# Patient Record
Sex: FEMALE | Race: WHITE | NOT HISPANIC OR LATINO | Employment: FULL TIME | URBAN - METROPOLITAN AREA
[De-identification: names, ages, dates, MRNs, and addresses within clinical notes are randomized per-mention and may not be internally consistent; named-entity substitution may affect disease eponyms.]

---

## 2023-04-21 ENCOUNTER — APPOINTMENT (OUTPATIENT)
Dept: PRIMARY CARE | Facility: CLINIC | Age: 63
End: 2023-04-21
Payer: COMMERCIAL

## 2023-05-04 ENCOUNTER — APPOINTMENT (OUTPATIENT)
Dept: PRIMARY CARE | Facility: CLINIC | Age: 63
End: 2023-05-04
Payer: COMMERCIAL

## 2023-05-11 ENCOUNTER — OFFICE VISIT (OUTPATIENT)
Dept: PRIMARY CARE | Facility: CLINIC | Age: 63
End: 2023-05-11
Payer: COMMERCIAL

## 2023-05-11 VITALS
HEART RATE: 90 BPM | WEIGHT: 150 LBS | BODY MASS INDEX: 29.45 KG/M2 | DIASTOLIC BLOOD PRESSURE: 85 MMHG | HEIGHT: 60 IN | SYSTOLIC BLOOD PRESSURE: 134 MMHG | OXYGEN SATURATION: 97 %

## 2023-05-11 DIAGNOSIS — Z12.31 ENCOUNTER FOR SCREENING MAMMOGRAM FOR MALIGNANT NEOPLASM OF BREAST: ICD-10-CM

## 2023-05-11 DIAGNOSIS — E78.5 HYPERLIPIDEMIA, UNSPECIFIED HYPERLIPIDEMIA TYPE: Primary | ICD-10-CM

## 2023-05-11 DIAGNOSIS — I10 BENIGN ESSENTIAL HYPERTENSION: ICD-10-CM

## 2023-05-11 PROBLEM — E55.9 VITAMIN D DEFICIENCY: Status: ACTIVE | Noted: 2023-05-11

## 2023-05-11 PROCEDURE — 3075F SYST BP GE 130 - 139MM HG: CPT | Performed by: NURSE PRACTITIONER

## 2023-05-11 PROCEDURE — 1036F TOBACCO NON-USER: CPT | Performed by: NURSE PRACTITIONER

## 2023-05-11 PROCEDURE — 99214 OFFICE O/P EST MOD 30 MIN: CPT | Performed by: NURSE PRACTITIONER

## 2023-05-11 PROCEDURE — 3079F DIAST BP 80-89 MM HG: CPT | Performed by: NURSE PRACTITIONER

## 2023-05-11 RX ORDER — TRIAMCINOLONE ACETONIDE 5 MG/G
CREAM TOPICAL
COMMUNITY
Start: 2023-03-21

## 2023-05-11 RX ORDER — PREDNISONE 10 MG/1
10 TABLET ORAL DAILY
COMMUNITY
Start: 2023-03-21 | End: 2023-05-11 | Stop reason: ALTCHOICE

## 2023-05-11 RX ORDER — LABETALOL 100 MG/1
1 TABLET, FILM COATED ORAL EVERY 12 HOURS
COMMUNITY
End: 2023-05-11 | Stop reason: SDUPTHER

## 2023-05-11 RX ORDER — ATORVASTATIN CALCIUM 10 MG/1
10 TABLET, FILM COATED ORAL DAILY
COMMUNITY
End: 2023-05-11 | Stop reason: SDUPTHER

## 2023-05-11 RX ORDER — ATORVASTATIN CALCIUM 10 MG/1
10 TABLET, FILM COATED ORAL DAILY
Qty: 90 TABLET | Refills: 1 | Status: SHIPPED | OUTPATIENT
Start: 2023-05-11 | End: 2023-11-15

## 2023-05-11 RX ORDER — VIT C/E/ZN/COPPR/LUTEIN/ZEAXAN 250MG-90MG
25 CAPSULE ORAL DAILY
COMMUNITY
Start: 2021-03-17

## 2023-05-11 RX ORDER — MELOXICAM 15 MG/1
15 TABLET ORAL DAILY
COMMUNITY
Start: 2021-01-04 | End: 2023-05-11 | Stop reason: ALTCHOICE

## 2023-05-11 RX ORDER — APREMILAST 30 MG/1
30 TABLET, FILM COATED ORAL DAILY
COMMUNITY
Start: 2020-09-14

## 2023-05-11 RX ORDER — LABETALOL 100 MG/1
1 TABLET, FILM COATED ORAL EVERY 12 HOURS
Qty: 180 TABLET | Refills: 1 | Status: SHIPPED | OUTPATIENT
Start: 2023-05-11 | End: 2023-11-15

## 2023-05-11 ASSESSMENT — ENCOUNTER SYMPTOMS
MUSCULOSKELETAL NEGATIVE: 1
SORE THROAT: 0
DIARRHEA: 0
SHORTNESS OF BREATH: 0
CHILLS: 0
FEVER: 0
VOMITING: 0
PSYCHIATRIC NEGATIVE: 1
DIZZINESS: 0
NAUSEA: 0
PALPITATIONS: 0
HEADACHES: 0
NUMBNESS: 0
CONSTIPATION: 0
ABDOMINAL PAIN: 0
WEAKNESS: 0
FATIGUE: 0
COUGH: 0

## 2023-05-11 ASSESSMENT — PATIENT HEALTH QUESTIONNAIRE - PHQ9
1. LITTLE INTEREST OR PLEASURE IN DOING THINGS: NOT AT ALL
2. FEELING DOWN, DEPRESSED OR HOPELESS: NOT AT ALL
SUM OF ALL RESPONSES TO PHQ9 QUESTIONS 1 AND 2: 0

## 2023-05-11 NOTE — PROGRESS NOTES
Subjective   Patient ID: Laura Bansal is a 63 y.o. female who presents for follow up and medication refills.    HPI   Patient overall feeling well  Denies chest pain, SOB, palpitations, dizziness,  or GI issues.  Needs refills and screening mammogram  Non-smoker  Physically active but no formal exercise routine  Have labs drawn through employer, will get fasting lipid panel   taking medications as prescribed  Blood pressure controlled    Review of Systems   Constitutional:  Negative for chills, fatigue and fever.   HENT:  Negative for congestion, ear pain and sore throat.    Eyes:  Negative for visual disturbance.   Respiratory:  Negative for cough and shortness of breath.    Cardiovascular:  Negative for chest pain, palpitations and leg swelling.   Gastrointestinal:  Negative for abdominal pain, constipation, diarrhea, nausea and vomiting.   Genitourinary: Negative.    Musculoskeletal: Negative.    Skin:  Negative for rash.   Neurological:  Negative for dizziness, weakness, numbness and headaches.   Psychiatric/Behavioral: Negative.         Objective   /85   Pulse 90   Ht 1.524 m (5')   Wt 68 kg (150 lb)   SpO2 97%   BMI 29.29 kg/m²     Physical Exam  Constitutional:       General: She is not in acute distress.     Appearance: Normal appearance.   HENT:      Head: Normocephalic and atraumatic.      Right Ear: Tympanic membrane, ear canal and external ear normal.      Left Ear: Tympanic membrane, ear canal and external ear normal.      Nose: Nose normal.      Mouth/Throat:      Mouth: Mucous membranes are moist.      Pharynx: Oropharynx is clear.   Eyes:      Extraocular Movements: Extraocular movements intact.      Conjunctiva/sclera: Conjunctivae normal.      Pupils: Pupils are equal, round, and reactive to light.   Cardiovascular:      Rate and Rhythm: Normal rate and regular rhythm.      Pulses: Normal pulses.      Heart sounds: Normal heart sounds. No murmur heard.  Pulmonary:      Effort:  Pulmonary effort is normal.      Breath sounds: Normal breath sounds. No wheezing, rhonchi or rales.   Abdominal:      General: Bowel sounds are normal.      Palpations: Abdomen is soft.      Tenderness: There is no abdominal tenderness.   Musculoskeletal:         General: Normal range of motion.      Cervical back: Normal range of motion and neck supple.   Lymphadenopathy:      Comments: No lymphadenopathy noted   Skin:     General: Skin is warm and dry.      Findings: No rash.   Neurological:      General: No focal deficit present.      Mental Status: She is alert and oriented to person, place, and time.      Cranial Nerves: No cranial nerve deficit.      Coordination: Coordination normal.      Gait: Gait normal.   Psychiatric:         Mood and Affect: Mood normal.         Behavior: Behavior normal.         Assessment/Plan   Problem List Items Addressed This Visit          Circulatory    Benign essential hypertension     Continue labetalol as prescribed  Reduce dietary sodium         Relevant Medications    labetalol (Normodyne) 100 mg tablet    Other Relevant Orders    CT cardiac scoring wo IV contrast       Other    Hyperlipidemia - Primary     Continue atorvastatin as prescribed         Relevant Medications    atorvastatin (Lipitor) 10 mg tablet    Other Relevant Orders    Lipid Panel     Other Visit Diagnoses       Encounter for screening mammogram for malignant neoplasm of breast        Relevant Orders    BI mammo bilateral screening tomosynthesis

## 2023-05-16 ENCOUNTER — LAB (OUTPATIENT)
Dept: LAB | Facility: LAB | Age: 63
End: 2023-05-16
Payer: COMMERCIAL

## 2023-05-16 DIAGNOSIS — E78.5 HYPERLIPIDEMIA, UNSPECIFIED HYPERLIPIDEMIA TYPE: ICD-10-CM

## 2023-05-16 LAB
CHOLESTEROL (MG/DL) IN SER/PLAS: 178 MG/DL (ref 0–199)
CHOLESTEROL IN HDL (MG/DL) IN SER/PLAS: 38.5 MG/DL
CHOLESTEROL/HDL RATIO: 4.6
LDL: 114 MG/DL (ref 0–99)
TRIGLYCERIDE (MG/DL) IN SER/PLAS: 127 MG/DL (ref 0–149)
VLDL: 25 MG/DL (ref 0–40)

## 2023-05-16 PROCEDURE — 80061 LIPID PANEL: CPT

## 2023-05-16 PROCEDURE — 36415 COLL VENOUS BLD VENIPUNCTURE: CPT

## 2023-08-02 ENCOUNTER — TELEPHONE (OUTPATIENT)
Dept: PRIMARY CARE | Facility: CLINIC | Age: 63
End: 2023-08-02
Payer: COMMERCIAL

## 2023-08-02 NOTE — TELEPHONE ENCOUNTER
Called with CT cardiac calcium results.  Went from 0 in 20 18-47 currently.  Offered the option of cardiology consult.  We can discuss at next visit.

## 2024-06-01 DIAGNOSIS — I10 BENIGN ESSENTIAL HYPERTENSION: ICD-10-CM

## 2024-06-01 DIAGNOSIS — E78.5 HYPERLIPIDEMIA, UNSPECIFIED HYPERLIPIDEMIA TYPE: ICD-10-CM

## 2024-06-03 RX ORDER — ATORVASTATIN CALCIUM 10 MG/1
10 TABLET, FILM COATED ORAL DAILY
Qty: 90 TABLET | Refills: 0 | Status: SHIPPED | OUTPATIENT
Start: 2024-06-03

## 2024-06-03 RX ORDER — LABETALOL 100 MG/1
1 TABLET, FILM COATED ORAL EVERY 12 HOURS
Qty: 180 TABLET | Refills: 0 | Status: SHIPPED | OUTPATIENT
Start: 2024-06-03

## 2024-09-04 ENCOUNTER — HOSPITAL ENCOUNTER (OUTPATIENT)
Dept: RADIOLOGY | Facility: HOSPITAL | Age: 64
Discharge: HOME | End: 2024-09-04
Payer: COMMERCIAL

## 2024-09-04 DIAGNOSIS — Z78.0 ASYMPTOMATIC MENOPAUSAL STATE: ICD-10-CM

## 2024-09-04 PROCEDURE — 77080 DXA BONE DENSITY AXIAL: CPT | Performed by: RADIOLOGY

## 2024-09-04 PROCEDURE — 77080 DXA BONE DENSITY AXIAL: CPT

## 2024-09-04 ASSESSMENT — LIFESTYLE VARIABLES
CURRENT_SMOKER: N
3_OR_MORE_DRINKS_PER_DAY: N

## 2024-10-17 DIAGNOSIS — I10 BENIGN ESSENTIAL HYPERTENSION: ICD-10-CM

## 2024-10-23 RX ORDER — LABETALOL 100 MG/1
TABLET, FILM COATED ORAL EVERY 12 HOURS
Qty: 180 TABLET | Refills: 0 | Status: SHIPPED | OUTPATIENT
Start: 2024-10-23

## 2025-01-17 ENCOUNTER — APPOINTMENT (OUTPATIENT)
Dept: PRIMARY CARE | Facility: CLINIC | Age: 65
End: 2025-01-17
Payer: COMMERCIAL

## 2025-01-17 VITALS
DIASTOLIC BLOOD PRESSURE: 80 MMHG | HEART RATE: 78 BPM | BODY MASS INDEX: 30.47 KG/M2 | SYSTOLIC BLOOD PRESSURE: 140 MMHG | WEIGHT: 156 LBS | OXYGEN SATURATION: 98 %

## 2025-01-17 DIAGNOSIS — E78.2 MIXED HYPERLIPIDEMIA: ICD-10-CM

## 2025-01-17 DIAGNOSIS — Z00.00 HEALTH CARE MAINTENANCE: Primary | ICD-10-CM

## 2025-01-17 DIAGNOSIS — E53.8 B12 DEFICIENCY: ICD-10-CM

## 2025-01-17 DIAGNOSIS — I10 BENIGN ESSENTIAL HYPERTENSION: ICD-10-CM

## 2025-01-17 PROBLEM — L40.50 PSORIATIC ARTHRITIS (MULTI): Status: ACTIVE | Noted: 2025-01-17

## 2025-01-17 PROBLEM — L40.9 PSORIASIS: Status: ACTIVE | Noted: 2025-01-17

## 2025-01-17 PROCEDURE — 3079F DIAST BP 80-89 MM HG: CPT

## 2025-01-17 PROCEDURE — 99213 OFFICE O/P EST LOW 20 MIN: CPT

## 2025-01-17 PROCEDURE — 3077F SYST BP >= 140 MM HG: CPT

## 2025-01-17 PROCEDURE — 99396 PREV VISIT EST AGE 40-64: CPT

## 2025-01-17 ASSESSMENT — PATIENT HEALTH QUESTIONNAIRE - PHQ9
SUM OF ALL RESPONSES TO PHQ9 QUESTIONS 1 AND 2: 0
1. LITTLE INTEREST OR PLEASURE IN DOING THINGS: NOT AT ALL
2. FEELING DOWN, DEPRESSED OR HOPELESS: NOT AT ALL

## 2025-01-17 NOTE — PROGRESS NOTES
Subjective   Patient ID: Laura Bansal is a 64 y.o. female who presents for Establish Care.              HPI  1.  Physical exam.  Pap: last done 2017, declines repeat   Mammogram: last done 2023  Colonoscopy: last done , ten years  Immunizations: none documented    2. HLD   CT cardiac score 47.68 as compared to 0 in 2018.   Atorvastatin 10mg     3. HTN  Labetalol 100mg BID   BP today 140/80    4. Psoriasis, psoriatic arthritis  Otezla 30mg     Social:  - never smoker  - no alcohol  - no illicits     Surgical:  - breat biopsies (benign)    Family hx:   - father: heart disease, CABG  - three aunts with breast cancer     Review of Systems  All pertinent positive symptoms are included in the history of present illness.    All other systems have been reviewed and are negative and noncontributory to this patient's current ailments.    Past Medical History:   Diagnosis Date    Asymptomatic menopausal state     Menopause    Encounter for full-term uncomplicated delivery      (spontaneous vaginal delivery)    Menopausal and female climacteric states     Hot flash, menopausal    Personal history of other complications of pregnancy, childbirth and the puerperium     History of     Personal history of other complications of pregnancy, childbirth and the puerperium     History of miscarriage    Personal history of other diseases of the circulatory system     History of hypertension    Personal history of other diseases of the musculoskeletal system and connective tissue     History of arthritis     Past Surgical History:   Procedure Laterality Date    BREAST SURGERY  2015    Breast Surgery Reduction Procedure    DILATION AND CURETTAGE OF UTERUS  2015    Dilation And Curettage    HAND SURGERY  2017    Hand Surgery                                                                                                                                                          OTHER SURGICAL  HISTORY  05/06/2015    Anal Fistulectomy    OTHER SURGICAL HISTORY  05/06/2015    Breast Surgery Puncture Aspiration Of Cyst     Social History     Tobacco Use    Smoking status: Never    Smokeless tobacco: Never   Vaping Use    Vaping status: Never Used   Substance Use Topics    Alcohol use: Never    Drug use: Never     No family history on file.    There is no immunization history on file for this patient.  Current Outpatient Medications   Medication Instructions    atorvastatin (LIPITOR) 10 mg, oral, Daily    cholecalciferol (VITAMIN D-3) 25 mcg, Daily    labetalol (Normodyne) 100 mg tablet oral, Every 12 hours    Otezla 30 mg, Daily    triamcinolone (Kenalog) 0.5 % cream ON THE SKIN APPLY TO PSORIASIS TWICE DAILY. 10 DAYS ON, 10 DAYS OFF. DO NOT USE ON THE FACE.     Allergies   Allergen Reactions    Morphine GI Upset    Adhesive Tape-Silicones Rash    Penicillins Hives and Rash       Objective   Vitals:    01/17/25 1336   BP: 140/80   Pulse: 78   SpO2: 98%   Weight: 70.8 kg (156 lb)     Body mass index is 30.47 kg/m².    BP Readings from Last 3 Encounters:   01/17/25 140/80   05/11/23 134/85   11/17/21 146/84      Wt Readings from Last 3 Encounters:   01/17/25 70.8 kg (156 lb)   05/11/23 68 kg (150 lb)   11/17/21 64.9 kg (143 lb)        No visits with results within 1 Month(s) from this visit.   Latest known visit with results is:   Lab on 05/16/2023   Component Date Value    Cholesterol 05/16/2023 178     HDL 05/16/2023 38.5 (A)     Cholesterol/HDL Ratio 05/16/2023 4.6     LDL 05/16/2023 114 (H)     VLDL 05/16/2023 25     Triglycerides 05/16/2023 127      Physical Exam  CONSTITUTIONAL - well nourished, well developed, looks like stated age, in no acute distress, not ill-appearing, and not tired appearing  SKIN - normal skin color and pigmentation, normal skin turgor without rash, lesions, or nodules visualized  HEAD - no trauma, normocephalic  EYES - pupils are equal and reactive to light, extraocular muscles  are intact, and normal external exam  NECK - supple without rigidity, no neck mass was observed, no thyromegaly or thyroid nodules  CHEST - clear to auscultation, no wheezing, no crackles and no rales, good effort  CARDIAC - regular rate and regular rhythm, no skipped beats, no murmur  NEUROLOGICAL - normal gait, normal balance, normal motor, no ataxia; alert, oriented and no focal signs  PSYCHIATRIC - alert, pleasant and cordial, age-appropriate  IMMUNOLOGIC - no cervical lymphadenopathy    Assessment/Plan   Problem List Items Addressed This Visit    None  Visit Diagnoses       Health care maintenance    -  Primary    Relevant Orders    BI mammo bilateral screening tomosynthesis    TSH with reflex to Free T4 if abnormal    Lipid Panel    Comprehensive Metabolic Panel    CBC and Auto Differential    Hemoglobin A1C    B12 deficiency        Relevant Orders    CBC and Auto Differential          Laura presents today to establish care and for annual exam.    Healthcare Maintenance  - declines repeat pap  - declines immunizations     HTN  - discussed switching labetalol to ACEI/arb or CCB but agreed to obtain updated labs first   - agreed to keep a BP log at home    HLD  - CT cardiac score 48  - discussed obtaining updated labs prior to potentially increasing statin therapy    Results will be communicated accordingly.    FU in one year for annual exam or earlier depending on lab results.    Piotr Haq, DO  PGY-2 Family Medicine

## 2025-01-21 DIAGNOSIS — I10 BENIGN ESSENTIAL HYPERTENSION: ICD-10-CM

## 2025-01-24 RX ORDER — LABETALOL 100 MG/1
TABLET, FILM COATED ORAL EVERY 12 HOURS
Qty: 180 TABLET | Refills: 0 | Status: SHIPPED | OUTPATIENT
Start: 2025-01-24

## 2025-02-01 DIAGNOSIS — E78.5 HYPERLIPIDEMIA, UNSPECIFIED HYPERLIPIDEMIA TYPE: ICD-10-CM

## 2025-02-03 RX ORDER — ATORVASTATIN CALCIUM 10 MG/1
10 TABLET, FILM COATED ORAL DAILY
Qty: 30 TABLET | Refills: 0 | OUTPATIENT
Start: 2025-02-03

## 2025-02-03 NOTE — TELEPHONE ENCOUNTER
Patient needs to obtain pending labs for refills. [facility] labs were over two years ago

## 2025-02-06 ENCOUNTER — HOSPITAL ENCOUNTER (OUTPATIENT)
Dept: RADIOLOGY | Facility: HOSPITAL | Age: 65
Discharge: HOME | End: 2025-02-06
Payer: COMMERCIAL

## 2025-02-06 VITALS — HEIGHT: 60 IN | BODY MASS INDEX: 30.63 KG/M2 | WEIGHT: 156 LBS

## 2025-02-06 DIAGNOSIS — Z12.31 ENCOUNTER FOR SCREENING MAMMOGRAM FOR MALIGNANT NEOPLASM OF BREAST: ICD-10-CM

## 2025-02-06 PROCEDURE — 77063 BREAST TOMOSYNTHESIS BI: CPT | Performed by: RADIOLOGY

## 2025-02-06 PROCEDURE — 77067 SCR MAMMO BI INCL CAD: CPT

## 2025-02-06 PROCEDURE — 77067 SCR MAMMO BI INCL CAD: CPT | Performed by: RADIOLOGY

## 2025-02-07 LAB
CHOLEST SERPL-MCNC: 199 MG/DL
CHOLEST/HDLC SERPL: 5.1 (CALC)
EST. AVERAGE GLUCOSE BLD GHB EST-MCNC: 108 MG/DL
EST. AVERAGE GLUCOSE BLD GHB EST-SCNC: 6 MMOL/L
HBA1C MFR BLD: 5.4 % OF TOTAL HGB
HDLC SERPL-MCNC: 39 MG/DL
LDLC SERPL CALC-MCNC: 135 MG/DL (CALC)
NONHDLC SERPL-MCNC: 160 MG/DL (CALC)
TRIGL SERPL-MCNC: 127 MG/DL

## 2025-03-06 ENCOUNTER — OFFICE VISIT (OUTPATIENT)
Dept: PRIMARY CARE | Facility: CLINIC | Age: 65
End: 2025-03-06
Payer: COMMERCIAL

## 2025-03-06 VITALS
OXYGEN SATURATION: 97 % | SYSTOLIC BLOOD PRESSURE: 150 MMHG | BODY MASS INDEX: 26.75 KG/M2 | HEIGHT: 63 IN | DIASTOLIC BLOOD PRESSURE: 84 MMHG | HEART RATE: 78 BPM | WEIGHT: 151 LBS

## 2025-03-06 DIAGNOSIS — R09.82 POST-NASAL DRIP: Primary | ICD-10-CM

## 2025-03-06 DIAGNOSIS — I10 BENIGN ESSENTIAL HYPERTENSION: ICD-10-CM

## 2025-03-06 PROCEDURE — 3079F DIAST BP 80-89 MM HG: CPT | Performed by: STUDENT IN AN ORGANIZED HEALTH CARE EDUCATION/TRAINING PROGRAM

## 2025-03-06 PROCEDURE — 3077F SYST BP >= 140 MM HG: CPT | Performed by: STUDENT IN AN ORGANIZED HEALTH CARE EDUCATION/TRAINING PROGRAM

## 2025-03-06 PROCEDURE — G2211 COMPLEX E/M VISIT ADD ON: HCPCS | Performed by: STUDENT IN AN ORGANIZED HEALTH CARE EDUCATION/TRAINING PROGRAM

## 2025-03-06 PROCEDURE — 99214 OFFICE O/P EST MOD 30 MIN: CPT | Performed by: STUDENT IN AN ORGANIZED HEALTH CARE EDUCATION/TRAINING PROGRAM

## 2025-03-06 PROCEDURE — 3008F BODY MASS INDEX DOCD: CPT | Performed by: STUDENT IN AN ORGANIZED HEALTH CARE EDUCATION/TRAINING PROGRAM

## 2025-03-06 RX ORDER — OLMESARTAN MEDOXOMIL 20 MG/1
20 TABLET ORAL DAILY
Qty: 30 TABLET | Refills: 1 | Status: SHIPPED | OUTPATIENT
Start: 2025-03-06 | End: 2025-05-05

## 2025-03-06 ASSESSMENT — PATIENT HEALTH QUESTIONNAIRE - PHQ9
2. FEELING DOWN, DEPRESSED OR HOPELESS: NOT AT ALL
SUM OF ALL RESPONSES TO PHQ9 QUESTIONS 1 AND 2: 0
1. LITTLE INTEREST OR PLEASURE IN DOING THINGS: NOT AT ALL

## 2025-03-06 NOTE — PROGRESS NOTES
Clinic Note: Family Medicine    Patient: Laura Bansal  Encounter Date: 3/6/25  Subjective:  Chief Complaint   Patient presents with    Hypertension     Sinus infection now      History of Present Illness:  Laura Bansal is a 64 y.o. female with past history of HTN, HLD who presents for HTN follow up though reports of nasal congestion and post nasal drip.    Working as a / for water [employer]   +sick contact   3 weeks symptoms head pressure, nasal congestion, post nasal drip. Denies fever, sore throat, otalgia.   She has been using dayquil, nightquil, cold and flu  She notes that she is getting better, most concern is drainage    Hypertension  Home BP average 150s/mid 80s  Taking olmesartan 20mg daily without adverse effects   Denies lightheadedness, dizziness, headache    Review of Systems:  See HPI    Past Medical History:   Past Medical History:   Diagnosis Date    Asymptomatic menopausal state     Menopause    Encounter for full-term uncomplicated delivery      (spontaneous vaginal delivery)    Menopausal and female climacteric states     Hot flash, menopausal    Personal history of other complications of pregnancy, childbirth and the puerperium     History of     Personal history of other complications of pregnancy, childbirth and the puerperium     History of miscarriage    Personal history of other diseases of the circulatory system     History of hypertension    Personal history of other diseases of the musculoskeletal system and connective tissue     History of arthritis       Past Surgical History:   Procedure Laterality Date    BREAST BIOPSY  1998    BREAST SURGERY  2015    Breast Surgery Reduction Procedure    DILATION AND CURETTAGE OF UTERUS  2015    Dilation And Curettage    HAND SURGERY  2017    Hand Surgery                                                                                                                                                           OTHER SURGICAL HISTORY  05/06/2015    Anal Fistulectomy    OTHER SURGICAL HISTORY  05/06/2015    Breast Surgery Puncture Aspiration Of Cyst    REDUCTION MAMMAPLASTY  1999       No family history on file.    Social History     Socioeconomic History    Marital status:      Spouse name: Not on file    Number of children: Not on file    Years of education: Not on file    Highest education level: Not on file   Occupational History    Not on file   Tobacco Use    Smoking status: Never    Smokeless tobacco: Never   Vaping Use    Vaping status: Never Used   Substance and Sexual Activity    Alcohol use: Never    Drug use: Never    Sexual activity: Not on file   Other Topics Concern    Not on file   Social History Narrative    Not on file     Social Drivers of Health     Financial Resource Strain: Not on file   Food Insecurity: Not on file   Transportation Needs: Not on file   Physical Activity: Not on file   Stress: Not on file   Social Connections: Not on file   Intimate Partner Violence: Not on file   Housing Stability: Not on file       Medications:  Patient's Medications   New Prescriptions    No medications on file   Previous Medications    ATORVASTATIN (LIPITOR) 10 MG TABLET    TAKE 1 TABLET BY MOUTH EVERY DAY    ATORVASTATIN (LIPITOR) 20 MG TABLET    Take 1 tablet (20 mg) by mouth once daily.    CHOLECALCIFEROL (VITAMIN D-3) 25 MCG (1000 UT) CAPSULE    Take 1 capsule (25 mcg) by mouth once daily.    LABETALOL (NORMODYNE) 100 MG TABLET    TAKE 1 TABLET BY MOUTH EVERY 12 HOURS    OTEZLA 30 MG TABLET    Take 1 tablet (30 mg) by mouth once daily.    TRIAMCINOLONE (KENALOG) 0.5 % CREAM    ON THE SKIN APPLY TO PSORIASIS TWICE DAILY. 10 DAYS ON, 10 DAYS OFF. DO NOT USE ON THE FACE.   Modified Medications    Modified Medication Previous Medication    OLMESARTAN (BENICAR) 20 MG TABLET olmesartan (BENIcar) 20 mg tablet       Take 1 tablet (20 mg) by mouth once daily.    Take 1 tablet (20 mg) by mouth once  "daily.   Discontinued Medications    No medications on file     Objective:    /84   Pulse 78   Ht 1.6 m (5' 3\")   Wt 68.5 kg (151 lb)   SpO2 97%   BMI 26.75 kg/m²     Physical Exam  Constitutional:       General: She is not in acute distress.     Appearance: Normal appearance. She is not ill-appearing, toxic-appearing or diaphoretic.   HENT:      Right Ear: Tympanic membrane, ear canal and external ear normal.      Left Ear: Tympanic membrane, ear canal and external ear normal.      Nose: Congestion and rhinorrhea present. Rhinorrhea is clear.      Right Turbinates: Not enlarged or swollen.      Left Turbinates: Not enlarged or swollen.      Right Sinus: No maxillary sinus tenderness or frontal sinus tenderness.      Left Sinus: No maxillary sinus tenderness or frontal sinus tenderness.      Mouth/Throat:      Mouth: Mucous membranes are moist.      Pharynx: No oropharyngeal exudate or posterior oropharyngeal erythema.   Eyes:      Extraocular Movements: Extraocular movements intact.   Cardiovascular:      Rate and Rhythm: Normal rate and regular rhythm.      Pulses: Normal pulses.      Heart sounds: Normal heart sounds. No murmur heard.     No friction rub. No gallop.   Pulmonary:      Effort: Pulmonary effort is normal. No respiratory distress.      Breath sounds: Normal breath sounds. No stridor. No wheezing, rhonchi or rales.   Chest:      Chest wall: No tenderness.   Musculoskeletal:      Right lower leg: No edema.      Left lower leg: No edema.   Lymphadenopathy:      Cervical: No cervical adenopathy.   Skin:     Capillary Refill: Capillary refill takes less than 2 seconds.   Neurological:      Mental Status: She is alert.       Results Reviewed:      Assessment and Plan:       1. Benign essential hypertension  Uncontrolled. Home BP average 150s/mid 80s.  Adherent to olmesartan 20 mg daily without adverse effects.  Though uncontrolled, her recent likely viral infection may be contributing to her " blood pressure.  We will continue olmesartan 20 mg daily and for her to follow-up in 4 weeks to reassess her blood pressure and titrate accordingly.  - olmesartan (BENIcar) 20 mg tablet; Take 1 tablet (20 mg) by mouth once daily.  Dispense: 30 tablet; Refill: 1    2. Post-nasal drip (Primary)  3 weeks of nasal congestion and postnasal drip with likely sick contacts.  She notes that she is getting better and is most concerned with drainage.  This is likely viral infection.  Discussed use of Flonase to help.  If not improving with postnasal drip, consider antihistamine PO. Discussed if fever, chills, sinus pain with thick, smelly discharge to return to reassess for concern of bacterial sinusitis.    Return to clinic in 4  weeks. Follow up HTN. Patient understands and agrees to plan. All questions and concerns were addressed.    Munir Holcomb MD  Primary Care Sports Medicine Fellow  [facility]  [facility]

## 2025-04-15 ENCOUNTER — APPOINTMENT (OUTPATIENT)
Dept: PRIMARY CARE | Facility: CLINIC | Age: 65
End: 2025-04-15
Payer: COMMERCIAL

## 2025-04-15 VITALS
HEART RATE: 103 BPM | SYSTOLIC BLOOD PRESSURE: 148 MMHG | BODY MASS INDEX: 28.13 KG/M2 | OXYGEN SATURATION: 97 % | DIASTOLIC BLOOD PRESSURE: 86 MMHG | WEIGHT: 149 LBS | HEIGHT: 61 IN

## 2025-04-15 DIAGNOSIS — I10 BENIGN ESSENTIAL HYPERTENSION: Primary | ICD-10-CM

## 2025-04-15 PROCEDURE — 99213 OFFICE O/P EST LOW 20 MIN: CPT | Performed by: STUDENT IN AN ORGANIZED HEALTH CARE EDUCATION/TRAINING PROGRAM

## 2025-04-15 PROCEDURE — 3008F BODY MASS INDEX DOCD: CPT | Performed by: STUDENT IN AN ORGANIZED HEALTH CARE EDUCATION/TRAINING PROGRAM

## 2025-04-15 PROCEDURE — 1159F MED LIST DOCD IN RCRD: CPT | Performed by: STUDENT IN AN ORGANIZED HEALTH CARE EDUCATION/TRAINING PROGRAM

## 2025-04-15 PROCEDURE — 3079F DIAST BP 80-89 MM HG: CPT | Performed by: STUDENT IN AN ORGANIZED HEALTH CARE EDUCATION/TRAINING PROGRAM

## 2025-04-15 PROCEDURE — 3077F SYST BP >= 140 MM HG: CPT | Performed by: STUDENT IN AN ORGANIZED HEALTH CARE EDUCATION/TRAINING PROGRAM

## 2025-04-15 PROCEDURE — 1036F TOBACCO NON-USER: CPT | Performed by: STUDENT IN AN ORGANIZED HEALTH CARE EDUCATION/TRAINING PROGRAM

## 2025-04-15 RX ORDER — OLMESARTAN MEDOXOMIL 40 MG/1
40 TABLET ORAL DAILY
Qty: 30 TABLET | Refills: 0 | Status: SHIPPED | OUTPATIENT
Start: 2025-04-15 | End: 2026-04-15

## 2025-04-15 NOTE — PROGRESS NOTES
"Subjective   Laura Bansal is a 65 y.o. female who presents for Follow-up.    HPI    HTN:  Taking olmesartan 20 mg as prescribed. Stopped taking labetalol 100 mg q12h because of fatigue.  Tolerating well without side effects.  Denies cardiopulmonary symptoms.  Home BP readings have been 150-160s/80-90s. BP this visit was 150/90, then 146/90.   She denies EtOH, no tobacco use.  Diet: home cooked meals  Exercise: walking with work. No exercise at home.    ROS:  All pertinent positive symptoms are included in the history of present illness.  All other systems have been reviewed and are negative and noncontributory to this patient's current ailments.    Objective     /86   Pulse 103   Ht 1.549 m (5' 1\")   Wt 67.6 kg (149 lb)   SpO2 97%   BMI 28.15 kg/m²   CONSTITUTIONAL - well nourished, well developed, looks like stated age, in no acute distress, not ill-appearing, and not tired appearing  SKIN - normal skin color and pigmentation, normal skin turgor without rash, lesions, or nodules visualized  HEAD - no trauma, normocephalic  EYES - EOMI with normal external exam  CHEST - clear to auscultation, no wheezing, no crackles and no rales  CARDIAC - tachycardic rate and rhythm, no murmurs or skipped beats  EXTREMITIES - no edema, no deformities  NEUROLOGICAL - normal gait, normal balance, normal motor, no ataxia, alert, oriented and no focal signs  PSYCHIATRIC - alert, oriented to time, place, person and no difficulty with speech or language  IMMUNOLOGIC - no lymphadenopathy    Assessment/Plan   Diagnoses and all orders for this visit:  Benign essential hypertension  Comments:  Average readings remain above goal   Will increase olmesartan to 40 mg   Continue home BP log and follow up 2 weeks  Orders:  -     olmesartan (Benicar) 40 mg tablet; Take 1 tablet (40 mg) by mouth once daily.      Blanca Jeffrey,   "

## 2025-04-30 NOTE — PROGRESS NOTES
Subjective   Patient ID: Laura Bansal is a 65 y.o. female who presents for No chief complaint on file..    HPI   Patient is 65 years old female came today to the office for follow-up on hypertension  She had persistent elevated blood pressure measurement and olmesartan was increased to 40 milligram tablet daily  Patient denied any shortness of breath, cough, chest pain, lightheadedness, lower leg swelling, change in vision  She request will medication refill  Her systolic blood pressure at home have been between 131-146 range    Review of Systems  All pertinent positive symptoms are included in the history of present illness.    All other systems have been reviewed and are negative and noncontributory to this patient's current ailments.  Objective   There were no vitals taken for this visit.    Physical Exam  General: Alert and oriented. Appears well-nourished and in no acute distress.  Head/neck: Normocephalic. Supple.  Lymphatics: No cervical lymphadenopathy.  Respiratory/Thorax: Clear to auscultation bilaterally. No wheezing.   Cardiovascular: Regular rate and rhythm. No murmurs.  Musculoskeletal: ROM intact. No joint swelling. Normal strength   Extremities: Warm and well perfused. No peripheral edema.  Psychological: Appropriate mood and affect.   Skin: No visible rashes or lesions.    Assessment/Plan   She is 65 years old female with past medical history of hypertension, dyslipidemia, vitamin D deficiency, psoriasis who presented today to the office for hypertension, blood pressure check    Hypertension  - Her blood pressure today is great  - Patient have done a log of blood pressure measurement at home but they were not resting blood pressure.   - Patient was advised to take her medication and do another week of blood pressure measurement and let us know through [employer].   - After the blood pressure measurement send will refill her blood pressure medication, olmesartan 40 for longer or add amlodipine based  on the numbers.   - Advised on low-salt diet, Mediterranean diet  - Advised to exercise    Follow-up in 6 months for chronic management or sooner if needed    I discussed my plan with my attending, Dr. Franko Duque. MD  Family medicine resident  PGY3

## 2025-05-01 ENCOUNTER — APPOINTMENT (OUTPATIENT)
Facility: CLINIC | Age: 65
End: 2025-05-01
Payer: COMMERCIAL

## 2025-05-01 VITALS
OXYGEN SATURATION: 99 % | SYSTOLIC BLOOD PRESSURE: 138 MMHG | WEIGHT: 148 LBS | BODY MASS INDEX: 27.94 KG/M2 | HEART RATE: 74 BPM | HEIGHT: 61 IN | DIASTOLIC BLOOD PRESSURE: 86 MMHG

## 2025-05-01 DIAGNOSIS — I10 BENIGN ESSENTIAL HYPERTENSION: Primary | ICD-10-CM

## 2025-05-01 ASSESSMENT — PATIENT HEALTH QUESTIONNAIRE - PHQ9
SUM OF ALL RESPONSES TO PHQ9 QUESTIONS 1 AND 2: 0
2. FEELING DOWN, DEPRESSED OR HOPELESS: NOT AT ALL
1. LITTLE INTEREST OR PLEASURE IN DOING THINGS: NOT AT ALL

## 2025-05-09 DIAGNOSIS — I10 BENIGN ESSENTIAL HYPERTENSION: ICD-10-CM

## 2025-05-09 DIAGNOSIS — E78.2 MIXED HYPERLIPIDEMIA: ICD-10-CM

## 2025-05-10 DIAGNOSIS — E78.2 MIXED HYPERLIPIDEMIA: ICD-10-CM

## 2025-05-10 DIAGNOSIS — I10 BENIGN ESSENTIAL HYPERTENSION: ICD-10-CM

## 2025-05-10 RX ORDER — ATORVASTATIN CALCIUM 20 MG/1
20 TABLET, FILM COATED ORAL DAILY
Qty: 90 TABLET | Refills: 1 | Status: SHIPPED | OUTPATIENT
Start: 2025-05-10 | End: 2025-11-06

## 2025-05-10 RX ORDER — OLMESARTAN MEDOXOMIL 40 MG/1
40 TABLET ORAL DAILY
Qty: 90 TABLET | Refills: 1 | Status: SHIPPED | OUTPATIENT
Start: 2025-05-10 | End: 2025-11-06

## 2025-05-12 RX ORDER — OLMESARTAN MEDOXOMIL 40 MG/1
40 TABLET ORAL DAILY
Qty: 90 TABLET | Refills: 1 | OUTPATIENT
Start: 2025-05-12 | End: 2025-11-08

## 2025-05-12 RX ORDER — ATORVASTATIN CALCIUM 20 MG/1
20 TABLET, FILM COATED ORAL DAILY
Qty: 90 TABLET | Refills: 1 | OUTPATIENT
Start: 2025-05-12 | End: 2025-11-08

## 2025-05-12 RX ORDER — OLMESARTAN MEDOXOMIL 40 MG/1
40 TABLET ORAL DAILY
Qty: 30 TABLET | Refills: 0 | OUTPATIENT
Start: 2025-05-12

## 2025-05-14 LAB
25(OH)D3+25(OH)D2 SERPL-MCNC: 53 NG/ML (ref 30–100)
ALBUMIN SERPL-MCNC: 4.5 G/DL (ref 3.6–5.1)
ALBUMIN/GLOB SERPL: 1.6 (CALC) (ref 1–2.5)
ALP SERPL-CCNC: 107 U/L (ref 37–153)
ALT SERPL-CCNC: 21 U/L (ref 6–29)
AST SERPL-CCNC: 18 U/L (ref 10–35)
BASOPHILS # BLD AUTO: 34 CELLS/UL (ref 0–200)
BASOPHILS NFR BLD AUTO: 0.5 %
BILIRUB SERPL-MCNC: 0.4 MG/DL (ref 0.2–1.2)
BUN SERPL-MCNC: 20 MG/DL (ref 7–25)
BUN/CREAT SERPL: NORMAL (CALC) (ref 6–22)
CALCIUM SERPL-MCNC: 9.1 MG/DL (ref 8.6–10.4)
CHLORIDE SERPL-SCNC: 106 MMOL/L (ref 98–110)
CO2 SERPL-SCNC: 25 MMOL/L (ref 20–32)
CREAT SERPL-MCNC: 0.68 MG/DL (ref 0.5–1.05)
CRP SERPL-MCNC: 12.8 MG/L
EGFRCR SERPLBLD CKD-EPI 2021: 97 ML/MIN/1.73M2
EOSINOPHIL # BLD AUTO: 134 CELLS/UL (ref 15–500)
EOSINOPHIL NFR BLD AUTO: 2 %
ERYTHROCYTE [DISTWIDTH] IN BLOOD BY AUTOMATED COUNT: 14.3 % (ref 11–15)
ERYTHROCYTE [SEDIMENTATION RATE] IN BLOOD BY WESTERGREN METHOD: 22 MM/H
GLOBULIN SER CALC-MCNC: 2.8 G/DL (CALC) (ref 1.9–3.7)
GLUCOSE SERPL-MCNC: 102 MG/DL (ref 65–139)
HCT VFR BLD AUTO: 42 % (ref 35–45)
HGB BLD-MCNC: 13.6 G/DL (ref 11.7–15.5)
LYMPHOCYTES # BLD AUTO: 1668 CELLS/UL (ref 850–3900)
LYMPHOCYTES NFR BLD AUTO: 24.9 %
MCH RBC QN AUTO: 28.2 PG (ref 27–33)
MCHC RBC AUTO-ENTMCNC: 32.4 G/DL (ref 32–36)
MCV RBC AUTO: 87.1 FL (ref 80–100)
MONOCYTES # BLD AUTO: 556 CELLS/UL (ref 200–950)
MONOCYTES NFR BLD AUTO: 8.3 %
NEUTROPHILS # BLD AUTO: 4308 CELLS/UL (ref 1500–7800)
NEUTROPHILS NFR BLD AUTO: 64.3 %
PLATELET # BLD AUTO: 304 THOUSAND/UL (ref 140–400)
PMV BLD REES-ECKER: 10.7 FL (ref 7.5–12.5)
POTASSIUM SERPL-SCNC: 4.3 MMOL/L (ref 3.5–5.3)
PROT SERPL-MCNC: 7.3 G/DL (ref 6.1–8.1)
RBC # BLD AUTO: 4.82 MILLION/UL (ref 3.8–5.1)
SODIUM SERPL-SCNC: 141 MMOL/L (ref 135–146)
T4 FREE SERPL-MCNC: 0.9 NG/DL (ref 0.8–1.8)
TSH SERPL-ACNC: 0.43 MIU/L (ref 0.4–4.5)
URATE SERPL-MCNC: 5.9 MG/DL (ref 2.5–7)
VIT B12 SERPL-MCNC: 1546 PG/ML (ref 200–1100)
WBC # BLD AUTO: 6.7 THOUSAND/UL (ref 3.8–10.8)

## 2025-11-06 ENCOUNTER — APPOINTMENT (OUTPATIENT)
Facility: CLINIC | Age: 65
End: 2025-11-06
Payer: COMMERCIAL